# Patient Record
Sex: FEMALE | Race: BLACK OR AFRICAN AMERICAN | NOT HISPANIC OR LATINO | Employment: UNEMPLOYED | URBAN - METROPOLITAN AREA
[De-identification: names, ages, dates, MRNs, and addresses within clinical notes are randomized per-mention and may not be internally consistent; named-entity substitution may affect disease eponyms.]

---

## 2024-04-14 ENCOUNTER — OFFICE VISIT (OUTPATIENT)
Dept: URGENT CARE | Facility: CLINIC | Age: 29
End: 2024-04-14
Payer: MEDICAID

## 2024-04-14 VITALS
WEIGHT: 97 LBS | HEART RATE: 97 BPM | DIASTOLIC BLOOD PRESSURE: 70 MMHG | SYSTOLIC BLOOD PRESSURE: 112 MMHG | OXYGEN SATURATION: 100 % | RESPIRATION RATE: 18 BRPM | TEMPERATURE: 97.7 F | BODY MASS INDEX: 15.59 KG/M2 | HEIGHT: 66 IN

## 2024-04-14 DIAGNOSIS — R11.2 NAUSEA AND VOMITING, UNSPECIFIED VOMITING TYPE: ICD-10-CM

## 2024-04-14 DIAGNOSIS — R19.7 DIARRHEA, UNSPECIFIED TYPE: ICD-10-CM

## 2024-04-14 LAB
APPEARANCE UR: CLEAR
BILIRUB UR STRIP-MCNC: NEGATIVE MG/DL
COLOR UR AUTO: YELLOW
GLUCOSE UR STRIP.AUTO-MCNC: NEGATIVE MG/DL
KETONES UR STRIP.AUTO-MCNC: NEGATIVE MG/DL
LEUKOCYTE ESTERASE UR QL STRIP.AUTO: NEGATIVE
NITRITE UR QL STRIP.AUTO: NEGATIVE
PH UR STRIP.AUTO: 7 [PH] (ref 5–8)
POCT INT CON NEG: NEGATIVE
POCT INT CON POS: POSITIVE
POCT URINE PREGNANCY TEST: NEGATIVE
PROT UR QL STRIP: NORMAL MG/DL
RBC UR QL AUTO: NEGATIVE
SP GR UR STRIP.AUTO: >=1.03
UROBILINOGEN UR STRIP-MCNC: 0.2 MG/DL

## 2024-04-14 PROCEDURE — 81002 URINALYSIS NONAUTO W/O SCOPE: CPT | Performed by: PHYSICIAN ASSISTANT

## 2024-04-14 PROCEDURE — 3078F DIAST BP <80 MM HG: CPT | Performed by: PHYSICIAN ASSISTANT

## 2024-04-14 PROCEDURE — 81025 URINE PREGNANCY TEST: CPT | Performed by: PHYSICIAN ASSISTANT

## 2024-04-14 PROCEDURE — 99203 OFFICE O/P NEW LOW 30 MIN: CPT | Performed by: PHYSICIAN ASSISTANT

## 2024-04-14 PROCEDURE — 3074F SYST BP LT 130 MM HG: CPT | Performed by: PHYSICIAN ASSISTANT

## 2024-04-14 RX ORDER — ONDANSETRON 4 MG/1
4 TABLET, ORALLY DISINTEGRATING ORAL ONCE
Status: COMPLETED | OUTPATIENT
Start: 2024-04-14 | End: 2024-04-14

## 2024-04-14 RX ORDER — ONDANSETRON 4 MG/1
4 TABLET, ORALLY DISINTEGRATING ORAL EVERY 6 HOURS PRN
Qty: 15 TABLET | Refills: 0 | Status: SHIPPED | OUTPATIENT
Start: 2024-04-14

## 2024-04-14 RX ADMIN — ONDANSETRON 4 MG: 4 TABLET, ORALLY DISINTEGRATING ORAL at 09:06

## 2024-04-14 NOTE — PROGRESS NOTES
"Subjective:   Bisi Mcgovern is a 28 y.o. female who presents for Nausea/Vomiting/Diarrhea (Emesis x 4-5 hours every 15 minutes. )     Fri AM diarrhea after ham sandwich that she made at home  Diarrhea was watery and hard to hold throughout the day  Saturday slightly better, went out last night and did drink a fair amount of alcohol  Vomiting started at 3 am last night sicne then q 15 minutes  Non bloody emesis or diarrhea  No severe abdominal pain  No bladder symptoms, denies dysuria hematuria frequency or urgency  LMP: now, not sexually active.      Medications:  This patient does not have an active medication from one of the medication groupers.    Allergies:             Patient has no allergy information on record.    Surgical History:       No past surgical history on file.    Past Social Hx:  Bisi Mcgovern       Past Family Hx:   Bisi Mcgovern family history is not on file.       Problem list, medications, and allergies reviewed by myself today in Epic.     Objective:     /70   Pulse 97   Temp 36.5 °C (97.7 °F)   Resp 18   Ht 1.67 m (5' 5.75\")   Wt 44 kg (97 lb)   SpO2 100%   BMI 15.78 kg/m²     Physical Exam  Vitals and nursing note reviewed.   Constitutional:       General: She is not in acute distress.     Appearance: Normal appearance. She is not ill-appearing, toxic-appearing or diaphoretic.   HENT:      Nose: Nose normal.      Mouth/Throat:      Mouth: Mucous membranes are moist.      Pharynx: No oropharyngeal exudate or posterior oropharyngeal erythema.   Eyes:      Extraocular Movements: Extraocular movements intact.      Pupils: Pupils are equal, round, and reactive to light.   Cardiovascular:      Rate and Rhythm: Normal rate and regular rhythm.      Pulses: Normal pulses.      Heart sounds: Normal heart sounds.   Pulmonary:      Effort: Pulmonary effort is normal. No tachypnea, accessory muscle usage, prolonged expiration, respiratory distress or retractions.      " Breath sounds: Normal breath sounds and air entry. No stridor or decreased air movement. No decreased breath sounds, wheezing, rhonchi or rales.      Comments: Lungs cta b/l  Abdominal:      General: Abdomen is flat. Bowel sounds are normal. There is no distension. There are no signs of injury.      Palpations: Abdomen is soft. There is no splenomegaly, mass or pulsatile mass.      Tenderness: There is no abdominal tenderness. There is no right CVA tenderness, left CVA tenderness, guarding or rebound. Negative signs include Stanley's sign, Rovsing's sign and McBurney's sign.      Hernia: No hernia is present.      Comments: Abdomen soft no guarding or rebound.  Negative McBurney's.  Negative Stanley's.  Bowel sounds present   Musculoskeletal:      Cervical back: No rigidity.   Lymphadenopathy:      Cervical: No cervical adenopathy.   Neurological:      Mental Status: She is alert.       UA negative other than concentrated urine, trace protein  Urine hCG negative.  Assessment/Plan:     Diagnosis and Associated Orders:     1. Nausea and vomiting, unspecified vomiting type  - POCT PREGNANCY  - POCT Urinalysis  - ondansetron (Zofran ODT) dispertab 4 mg  - ondansetron (ZOFRAN ODT) 4 MG TABLET DISPERSIBLE; Take 1 Tablet by mouth every 6 hours as needed for Nausea/Vomiting for up to 15 doses.  Dispense: 15 Tablet; Refill: 0    2. Diarrhea, unspecified type    Other orders  - norethindrone-ethinyl estradiol (TRIPHASIL) 0.5/0.75/1-35 MG-MCG per tablet; Take 1 Tablet by mouth every day.        Comments/MDM:  Patient presents with diarrhea that started on Friday after eating a ham sandwich that she made at home.  Resolution of symptoms on Saturday, went out last night had a fair amount of alcohol and has had vomiting since then.  Unknown if gastroenteritis related to possible foodborne illness with subsequent EtOH.  Vital signs stable and reassuring.  Abdomen soft without guarding or rebound.  Urine hCG negative.  Zofran  administered in clinic.  Prescription for Zofran provided.  Advised electrolyte repletion with 50-50 mix of electrolyte sports drink and water.  Advance diet as tolerated with bland diet.  Nonbloody emesis or stool.  Discussed signs of dehydration and indications for return.  I personally reviewed prior external notes and test results pertinent to today's visit. Supportive care, natural history, differential diagnoses, and indications for immediate follow-up discussed. Return to clinic or go to ED if symptoms worsen or persist.  Red flag symptoms discussed.  Patient/Parent/Guardian voices understanding. Follow-up with your primary care provider in 3-5 days.  All side effects of medication discussed including allergic response, GI upset, tendon injury, rash, sedation etc    Please note that this dictation was created using voice recognition software. I have made a reasonable attempt to correct obvious errors, but I expect that there are errors of grammar and possibly content that I did not discover before finalizing the note.    This note was electronically signed by Layla Marcus PA-C

## 2025-01-09 NOTE — LETTER
April 14, 2024    To Whom It May Concern:         This is confirmation that Bisi Mcgovern attended her scheduled appointment with Layla Marcus P.A.-C. on 4/14/24.  Please excuse patient from school on 4/15/2024.         If you have any questions please do not hesitate to call me at the phone number listed below.    Sincerely,          Layla Marcus P.A.-C.  [phone]                  
no